# Patient Record
Sex: MALE | Race: OTHER | Employment: FULL TIME | URBAN - METROPOLITAN AREA
[De-identification: names, ages, dates, MRNs, and addresses within clinical notes are randomized per-mention and may not be internally consistent; named-entity substitution may affect disease eponyms.]

---

## 2018-08-23 ENCOUNTER — HOSPITAL ENCOUNTER (EMERGENCY)
Age: 38
Discharge: HOME | End: 2018-08-23
Attending: EMERGENCY MEDICINE
Payer: SELF-PAY

## 2018-08-23 VITALS
HEART RATE: 71 BPM | OXYGEN SATURATION: 100 % | SYSTOLIC BLOOD PRESSURE: 109 MMHG | TEMPERATURE: 98.5 F | DIASTOLIC BLOOD PRESSURE: 63 MMHG | WEIGHT: 158.29 LBS | RESPIRATION RATE: 11 BRPM

## 2018-08-23 DIAGNOSIS — S61.512A LACERATION OF LEFT WRIST, INITIAL ENCOUNTER: Primary | ICD-10-CM

## 2018-08-23 PROCEDURE — 90715 TDAP VACCINE 7 YRS/> IM: CPT | Performed by: EMERGENCY MEDICINE

## 2018-08-23 PROCEDURE — 75810000293 HC SIMP/SUPERF WND  RPR

## 2018-08-23 PROCEDURE — 99282 EMERGENCY DEPT VISIT SF MDM: CPT

## 2018-08-23 PROCEDURE — 77030018836 HC SOL IRR NACL ICUM -A

## 2018-08-23 PROCEDURE — 90471 IMMUNIZATION ADMIN: CPT

## 2018-08-23 PROCEDURE — 77030031132 HC SUT NYL COVD -A

## 2018-08-23 PROCEDURE — 74011250636 HC RX REV CODE- 250/636: Performed by: EMERGENCY MEDICINE

## 2018-08-23 RX ADMIN — TETANUS TOXOID, REDUCED DIPHTHERIA TOXOID AND ACELLULAR PERTUSSIS VACCINE, ADSORBED 0.5 ML: 5; 2.5; 8; 8; 2.5 SUSPENSION INTRAMUSCULAR at 13:48

## 2018-08-23 NOTE — ED NOTES
Bedside and Verbal shift change report given to Lorne Gutierrez (oncoming nurse) by Jodi Torres (offgoing nurse). Report included the following information SBAR, Kardex and ED Summary.

## 2018-08-23 NOTE — ED NOTES
MD Peoples have reviewed discharge instructions with the patient/. The patient verbalized understanding.   Pt leaving with coworker driving

## 2018-08-23 NOTE — ED NOTES
A 3-way phone call was made to [employer], with the RN, ED physician, patient and pt's Cymraes/chuj speaking co-worker, Sarah Lim ([phone]). [employer]  #[id number] translated the RN's and ED Physician's questions and instructions from [place] into [facility] [place] to Sarah Lim. Sarah Lim translated the information to the pt, in [address] [place] [address] Street, and vice versa. The patient provided consent for this form of communication, and he verbalized understanding. An opportunity was provided by the RN and ED Physician, to the pt, for questions and clarification.

## 2018-08-23 NOTE — ED PROVIDER NOTES
EMERGENCY DEPARTMENT HISTORY AND PHYSICAL EXAM      Date: 8/23/2018  Patient Name: Janeen Armendariz    History of Presenting Illness     Chief Complaint   Patient presents with    Laceration     Ambulatory into the ED with c/o laceration to Lt wrist which happened while he was sharpening a knife at work pta. History Provided By: Patient    HPI: Janeen Armendariz, 30 y.o. male, presents ambulatory to the ED with cc of a laceration to his left wrist. Pt states that he had accidentally cut his wrist while at work. Pain minimal. Bleeding controlled. HPI is limited due to pt's language barrier. Pt speaks [employer], and [employer] does not offer this language translation. Used Taiwanese to [employer] translation. PCP: None      Past History     Past Medical History:  History reviewed. No pertinent past medical history. Past Surgical History:  History reviewed. No pertinent surgical history. Family History:  History reviewed. No pertinent family history. Social History:  Social History   Substance Use Topics    Smoking status: None    Smokeless tobacco: None    Alcohol use None       Allergies:  No Known Allergies      Review of Systems   Review of Systems   Unable to perform ROS: Other       Physical Exam   Physical Exam   Constitutional: He is oriented to person, place, and time. He appears well-developed and well-nourished. HENT:   Head: Normocephalic and atraumatic. Moist mucous membranes   Eyes: Conjunctivae are normal. Pupils are equal, round, and reactive to light. Right eye exhibits no discharge. Left eye exhibits no discharge. Neck: Normal range of motion. Neck supple. No tracheal deviation present. Cardiovascular: Normal rate, regular rhythm and normal heart sounds. No murmur heard. Pulses:       Radial pulses are 2+ on the right side, and 2+ on the left side. Pulmonary/Chest: Effort normal and breath sounds normal. No respiratory distress. He has no wheezes. He has no rales. Abdominal: Soft. Bowel sounds are normal. There is no tenderness. There is no rebound and no guarding. Musculoskeletal: Normal range of motion. He exhibits no edema, tenderness or deformity. Neurological: He is alert and oriented to person, place, and time. Sensation intact   Skin: Skin is warm and dry. Laceration (L shaped lac to radial aspect of left wrist) noted. No rash noted. No erythema. Psychiatric: His behavior is normal.   Nursing note and vitals reviewed. Medical Decision Making   I am the first provider for this patient. I reviewed the vital signs, available nursing notes, past medical history, past surgical history, family history and social history. Vital Signs-Reviewed the patient's vital signs. Patient Vitals for the past 12 hrs:   Temp Pulse Resp BP SpO2   08/23/18 1214 98.5 °F (36.9 °C) 71 11 109/63 100 %       Pulse Oximetry Analysis - 100% on room air    Cardiac Monitor:   Rate: 71 bpm  Rhythm: Normal Sinus Rhythm 109/63     Provider Notes (Medical Decision Making):   DDx: laceration    ED Course:   Initial assessment performed. The patients presenting problems have been discussed, and they are in agreement with the care plan formulated and outlined with them. I have encouraged them to ask questions as they arise throughout their visit. Procedure Note - Laceration Repair:  2:20 PM  Procedure by CHRISTINA Spencer. Complexity: simple  3cm jagged laceration to left wrist  was irrigated copiously with NS under jet lavage, prepped with Hibiclens and draped in a sterile fashion. The area was anesthetized with 5 mLs of  Lidocaine 2% with epinephrine via local infiltration. The wound was explored with the following results: No foreign bodies found. The wound was repaired with One layer suture closure: Skin Layer:  4 sutures placed, stitch type:simple interrupted, suture: 4-0 nylon. .  The wound was closed with good hemostasis and approximation. Sterile dressing applied.   Estimated blood loss: <1 mL  The procedure took 1-15 minutes, and pt tolerated well. Disposition:  DISCHARGE NOTE  2:31 PM  The patient has been re-evaluated and is ready for discharge. Reviewed available results with patient. Counseled patient on diagnosis and care plan. Patient has expressed understanding, and all questions have been answered. Patient agrees with plan and agrees to follow up as recommended, or return to the ED if their symptoms worsen. Discharge instructions have been provided and explained to the patient, along with reasons to return to the ED. PLAN:  1. There are no discharge medications for this patient. 2.   Follow-up Information     Follow up With Details Comments Contact Info    [facility] EMERGENCY DEPT  For suture removal 7-10 days [address] [place] [address]  [phone]        Return to ED if worse     Diagnosis     Clinical Impression:   1. Laceration of left wrist, initial encounter        Attestations: This note is prepared by Katia Mccullough, acting as Scribe for Prashanth Craven DO. Prashanth Craven DO: The scribe's documentation has been prepared under my direction and personally reviewed by me in its entirety. I confirm that the note above accurately reflects all work, treatment, procedures, and medical decision making performed by me.

## 2018-08-23 NOTE — DISCHARGE INSTRUCTIONS
Romeo: Instrucciones de cuidado - [ Cuts: Care Instructions ]  Instrucciones de cuidado  Un kris puede ocurrir en cualquier parte del cuerpo. A veces, se usan puntos de sutura, grapas, Revolucionadolabs para la piel o cintas Jaama 45 llamadas Steri-Strips para mantener juntos los bordes de un kris y ayudar a que sane. Las cintas Steri-Strip pueden usarse por sí solas o junto con puntos de sutura o grapas. Otras veces, se trent los romeo abiertos. Si el kris es profundo y [employer], es posible que el médico haya cerrado el kris en dos capas. Mary capa más profunda de puntos de sutura junta la parte profunda del kris. Estos puntos se disuelven y no es necesario extraerlos. El cierre de la capa superior, que puede [place] por medio de puntos, Self, Steri-Strips o Revolucionadolabs, es lo que se puede lamont sobre el kris. Con frecuencia, un kris se cubre con mary venda. El médico lo ha examinado minuciosamente, artem pueden presentarse problemas más tarde. Si nota algún problema o nuevos síntomas, busque tratamiento médico de inmediato. La atención de seguimiento es mary parte clave de ruiz tratamiento y seguridad. Asegúrese de hacer y acudir a todas las citas, y llame a ruiz médico si está teniendo problemas. También es mary buena idea saber los resultados de iram exámenes y mantener mary lista de los medicamentos que thomas. ¿Cómo puede cuidarse en el hogar? Si un kris está abierto o cerrado  · Apoye la paola afectada sobre mary almohada en cualquier momento que esté sentado o acostado clarisse los 3 días siguientes. Trate de mantenerla por encima del nivel del corazón. [place] ayudará a reducir la hinchazón. · Mantenga la herida seca clarisse las primeras 24 a 48 horas. Después de [address] [facility], puede ducharse si el médico lo [place]. Seque el kris con toques suaves de toalla. · No remoje el kirs, sal en mary berta (bañera). Ruiz médico le indicará cuándo es seguro mojar el kris.   · [address] primeras 24 a 48 horas, limpie el kris con agua y jabón 2 veces al día, a menos que el médico le dé indicaciones diferentes. ¨ No use peróxido de hidrógeno (agua [place]) ni alcohol, los cuales pueden retrasar la sanación. ¨ Puede cubrir el kris con mary capa delgada de vaselina y mary venda antiadherente. ¨ Si el médico colocó mary venda sobre el kris, colóquese mary venda nueva después de limpiar el kris o si la venda se moja o se ensucia. · Evite cualquier actividad que pudiera hacer que el kris se prince de nuevo. · Sea shiva con los medicamentos. [place]line y siga todas las indicaciones de la [place]. ¨ Si el médico le recetó un analgésico (medicamento para el dolor), tómelo según las indicaciones. ¨ Si no está tomando un analgésico recetado, pregúntele a ye médico si puede kathy damaso de [employer]. Si se cerró el kris con puntos, grapas o Steri-Strips  · Siga las instrucciones anteriores para los romeo abiertos o cerrados. · No se quite los puntos o las grapas por sí mismo. El médico le indicará cuándo debe volver para que le quiten los [address] grapas. · Deje puestas las Steri-Strips hasta que se desprendan por sí solas. Si se cerró el kris con un adhesivo para la piel  · Siga las instrucciones anteriores para los romeo abiertos o cerrados. · Déjese puesto el [employer] sobre la piel hasta que se desprenda por sí solo. [place] puede tardar entre 5 y 7 días. · No se rasque, frote ni hurgue el [employer]. · No se aplique la parte pegajosa de mary venda directamente sobre el [employer]. · No se aplique ningún tipo de pomada, crema o loción sobre la paola. [place] puede hacer que el [employer] se desprenda demasiado pronto. No use peróxido de hidrógeno (agua [place]) ni alcohol, los cuales pueden retrasar la sanación. ¿Cuándo debes pedir ayuda?   Llama a tu médico ahora mismo o busca atención médica inmediata si:    · Tienes dolor nuevo, o el dolor empeora.     · La piel cerca del kris está fría o pálida, o cambia de color.     · Sientes hormigueo, debilitamiento o entumecimiento cerca del kris.     · El kris comienza a sangrar, y la kelly empapa la venda. Es normal que salga mary pequeña cantidad de kelly.     · Tienes dificultades para  la paola cercana al kris.     · Tienes síntomas de infección, tales sal:  ¨ Aumento del dolor, la hinchazón, el enrojecimiento o la temperatura alrededor del kris. ¨ Vetas rojizas que comienzan en el kris. ¨ Pus que sale del kris. Lisa Mom Archie Spurr especial atención a los cambios en tu dara y asegúrate de comunicarte con tu médico si:    · El kris vuelve a abrirse.     · No mejoras sal se esperaba. ¿Dónde puede encontrar más información en inglés? Marjorie Zelaya a [email or web address] Teresa Day [id number] en la búsqueda para aprender más acerca de \"[place]: Instrucciones de cuidado - [ Cuts: Care Instructions ]. \"  Revisado: 20 noviembre, 2017  Versión del contenido: 11.7  © 9247-5903 Healthwise, Incorporated. Las instrucciones de cuidado fueron adaptadas bajo licencia por [employer] (which disclaims liability or warranty for this information). Si usted tiene Jenkins Humnoke afección médica o sobre estas instrucciones, siempre pregunte a ye profesional de dara. Healthwise, Incorporated niega toda garantía o responsabilidad por ye uso de esta información.

## 2018-08-23 NOTE — ED NOTES
Pt is from [facility] and his primary language is [employer]. [employer] does not offer this language translation. Nursing supervisor is coming to the ED.

## 2023-06-14 NOTE — ED NOTES
Will evaluate skin when MD performs procedure. Ck5 - Negative Histology Text: CK staining demonstrates a normal density and pattern.